# Patient Record
Sex: MALE | Race: WHITE
[De-identification: names, ages, dates, MRNs, and addresses within clinical notes are randomized per-mention and may not be internally consistent; named-entity substitution may affect disease eponyms.]

---

## 2020-10-17 ENCOUNTER — HOSPITAL ENCOUNTER (EMERGENCY)
Dept: HOSPITAL 97 - ER | Age: 65
LOS: 1 days | Discharge: ANOTHER HOSPITAL | End: 2020-10-18
Payer: COMMERCIAL

## 2020-10-17 DIAGNOSIS — Z20.828: ICD-10-CM

## 2020-10-17 DIAGNOSIS — I71.4: Primary | ICD-10-CM

## 2020-10-17 DIAGNOSIS — N13.30: ICD-10-CM

## 2020-10-17 DIAGNOSIS — N13.4: ICD-10-CM

## 2020-10-17 DIAGNOSIS — I10: ICD-10-CM

## 2020-10-17 LAB
ALBUMIN SERPL BCP-MCNC: 3.6 G/DL (ref 3.4–5)
ALP SERPL-CCNC: 98 U/L (ref 45–117)
ALT SERPL W P-5'-P-CCNC: 25 U/L (ref 12–78)
AST SERPL W P-5'-P-CCNC: 15 U/L (ref 15–37)
BUN BLD-MCNC: 16 MG/DL (ref 7–18)
GLUCOSE SERPLBLD-MCNC: 110 MG/DL (ref 74–106)
HCT VFR BLD CALC: 42.1 % (ref 39.6–49)
LIPASE SERPL-CCNC: 102 U/L (ref 73–393)
LYMPHOCYTES # SPEC AUTO: 0.8 K/UL (ref 0.7–4.9)
MORPHOLOGY BLD-IMP: (no result)
PMV BLD: 9 FL (ref 7.6–11.3)
POTASSIUM SERPL-SCNC: 4.1 MMOL/L (ref 3.5–5.1)
RBC # BLD: 4.81 M/UL (ref 4.33–5.43)

## 2020-10-17 PROCEDURE — 74175 CTA ABDOMEN W/CONTRAST: CPT

## 2020-10-17 PROCEDURE — 85025 COMPLETE CBC W/AUTO DIFF WBC: CPT

## 2020-10-17 PROCEDURE — 80048 BASIC METABOLIC PNL TOTAL CA: CPT

## 2020-10-17 PROCEDURE — 99285 EMERGENCY DEPT VISIT HI MDM: CPT

## 2020-10-17 PROCEDURE — 76705 ECHO EXAM OF ABDOMEN: CPT

## 2020-10-17 PROCEDURE — 36415 COLL VENOUS BLD VENIPUNCTURE: CPT

## 2020-10-17 PROCEDURE — 71275 CT ANGIOGRAPHY CHEST: CPT

## 2020-10-17 PROCEDURE — 80076 HEPATIC FUNCTION PANEL: CPT

## 2020-10-17 PROCEDURE — 96375 TX/PRO/DX INJ NEW DRUG ADDON: CPT

## 2020-10-17 PROCEDURE — 96374 THER/PROPH/DIAG INJ IV PUSH: CPT

## 2020-10-17 PROCEDURE — 83690 ASSAY OF LIPASE: CPT

## 2020-10-17 NOTE — XMS REPORT
Continuity of Care Document

                           Created on:2020



Patient:MERY JIMENEZ

Sex:Male

:1955

External Reference #:[id number]





Demographics







                          Address                   [address] [place] [address] RO[address] [facility]



                                                    [address]



                                                    [place] [address]

 

                          Home Phone                [phone]

 

                          Work Phone                [phone]

 

                          Mobile Phone              [phone]

 

                          Email Address             DECLINED

 

                          Preferred Language        English

 

                          Marital Status            Unknown

 

                          Restorationist Affiliation     Unknown

 

                          Race                      Unknown

 

                          Additional Race(s)        Unavailable



                                                    White

 

                          Ethnic Group              Not  or 









Author







                          Organization              [facility]

t

 

                          Address                   [address]



                                                    [place] [address]

 

                          Phone                     [phone]









Support







                Name            Relationship    Address         Phone

 

                Contact         Other           Unavailable     [phone]









Care Team Providers







                    Name                Role                Phone

 

                    Lab,  Fam Pob I     Attending Clinician Unavailable

 

                    Doctor Unassigned,  Name Attending Clinician Unavailable









Problems

This patient has no known problems.



Allergies, Adverse Reactions, Alerts

This patient has no known allergies or adverse reactions.



Medications

This patient has no known medications.



Procedures

This patient has no known procedures.



Encounters







        Start   End     Encounter Admission Attending Care    Care    Encounter 

Source



        Date/Time Date/Time Type    Type    Clinicians Facility Department ID   

   

 

        2020-10-12 2020-10-12 Laboratory         Lab, [facility]    [id number]

[id number] 



        10:31:52 10:51:52 Only            [facility]am [facility]  [id number]         



                                                Marvin [id number]         



                                                Paz [phone]         



                                                nal     044             



                                                Office                  



                                                Building                 



                                                One                     

 

        2020-10-12 2020-10-12 Letter          Doctor  MARIZA    [id number]

31 



        00:00:00 00:00:00 (Out)           UnassignedLOUIS   [id number]       

  



                                        [facility] [id number]         



                                                        [phone]         



                                                        044             







Results

This patient has no known results.

## 2020-10-17 NOTE — EDPHYS
Physician Documentation                                                                           

 [facility]                                                                 

Name: Cj Antoine                                                                               

Age: 65 yrs                                                                                       

Sex: Male                                                                                         

: 1955                                                                                   

MRN: [id number]                                                                                   

Arrival Date: 10/17/2020                                                                          

Time: 19:42                                                                                       

Account#: [id number]                                                                            

Bed 13                                                                                            

Private MD: Drew Minor ED Physician Alex Forte                                                                      

HPI:                                                                                              

10/17                                                                                             

20:03 This 65 yrs old  Male presents to ER via Ambulatory with complaints of         snw 

      Abdominal Cramping.                                                                         

20:03 The patient presents with abdominal pain abdominal distention epigastric area,          snw 

      umbilical area and suprapubic area. Onset: The symptoms/episode began/occurred              

      suddenly, this morning. The symptoms do not radiate. Associated signs and symptoms:         

      Pertinent positives: nausea and vomiting. The symptoms are described as crampy.             

      Severity of pain: At its worst the pain was severe in the emergency department the pain     

      is unchanged. The patient has not experienced similar symptoms in the past. The patient     

      has not recently seen a physician.                                                          

                                                                                                  

Historical:                                                                                       

- Allergies:                                                                                      

19:53 No Known Allergies;                                                                     aj1 

- Home Meds:                                                                                      

19:53 amlodipine oral [Active];                                                               aj1 

- PMHx:                                                                                           

19:53 Hypertension;                                                                           aj1 

                                                                                                  

- Immunization history:: Flu vaccine is not up to date.                                           

- Social history:: Smoking status: Patient/guardian denies using tobacco.                         

                                                                                                  

                                                                                                  

ROS:                                                                                              

20:02 Constitutional: Negative for fever, chills, and weight loss, Eyes: Negative for injury, snw 

      pain, redness, and discharge, ENT: Negative for injury, pain, and discharge, Neck:          

      Negative for injury, pain, and swelling, Cardiovascular: Negative for chest pain,           

      palpitations, and edema, Respiratory: Negative for shortness of breath, cough,              

      wheezing, and pleuritic chest pain, Back: Negative for injury and pain, : Negative        

      for injury, bleeding, discharge, and swelling, MS/Extremity: Negative for injury and        

      deformity, Skin: Negative for injury, rash, and discoloration, Neuro: Negative for          

      headache, weakness, numbness, tingling, and seizure, Psych: Negative for depression,        

      anxiety, suicide ideation, homicidal ideation, and hallucinations.                          

20:02 Abdomen/GI: Positive for abdominal pain, nausea and vomiting, abdominal cramps,             

      abdominal distension.                                                                       

                                                                                                  

Exam:                                                                                             

20:01 Constitutional:  This is a well developed, well nourished patient who is awake, alert,  snw 

      and in no acute distress. Head/Face:  Normocephalic, atraumatic. Eyes:  Pupils equal        

      round and reactive to light, extra-ocular motions intact.  Lids and lashes normal.          

      Conjunctiva and sclera are non-icteric and not injected.  Cornea within normal limits.      

      Periorbital areas with no swelling, redness, or edema. ENT:  Nares patent. No nasal         

      discharge, no septal abnormalities noted.  Tympanic membranes are normal and external       

      auditory canals are clear.  Oropharynx with no redness, swelling, or masses, exudates,      

      or evidence of obstruction, uvula midline.  Mucous membranes moist. Neck:  Trachea          

      midline, no thyromegaly or masses palpated, and no cervical lymphadenopathy.  Supple,       

      full range of motion without nuchal rigidity, or vertebral point tenderness.  No            

      Meningismus. Chest/axilla:  Normal chest wall appearance and motion.  Nontender with no     

      deformity.  No lesions are appreciated. Cardiovascular:  Regular rate and rhythm with a     

      normal S1 and S2.  No gallops, murmurs, or rubs.  Normal PMI, no JVD.  No pulse             

      deficits. Respiratory:  Lungs have equal breath sounds bilaterally, clear to                

      auscultation and percussion.  No rales, rhonchi or wheezes noted.  No increased work of     

      breathing, no retractions or nasal flaring. Abdomen/GI:  Soft, tender, with normal          

      bowel sounds.  + distension no tympany.   Back:  No spinal tenderness.  No                  

      costovertebral tenderness.  Full range of motion. Skin:  Warm, dry with normal turgor.      

      Normal color with no rashes, no lesions, and no evidence of cellulitis. MS/ Extremity:      

      Pulses equal, no cyanosis.  Neurovascular intact.  Full, normal range of motion. Neuro:     

       Awake and alert, GCS 15, oriented to person, place, time, and situation.  Cranial          

      nerves II-XII grossly intact.  Motor strength 5/5 in all extremities.  Sensory grossly      

      intact.  Cerebellar exam normal.  Normal gait. Psych:  Awake, alert, with orientation       

      to person, place and time.  Behavior, mood, and affect are within normal limits.            

                                                                                                  

Vital Signs:                                                                                      

19:50  / 88; Pulse 65; Resp 18; Temp 97.7(O); Pulse Ox 98% on R/A; Weight 79.38 kg (R); aj1 

      Height 5 ft. 10 in. (177.80 cm) (R); Pain 10/10;                                            

20:19  / 82 LA Supine (auto/reg);                                                       dh4 

20:19  / 84 RA Supine (auto/reg);                                                       dh4 

21:57  / 84; Pulse 68; Resp 16; Pulse Ox 96% on R/A;                                    ll2 

22:36  / 62; Pulse 65; Resp 16; Pulse Ox 93% on R/A;                                    ll2 

23:30  / 69; Pulse 55; Resp 15; Pulse Ox 95% on 2 lpm NC;                               ll2 

19:50 Body Mass Index 25.11 (79.38 kg, 177.80 cm)                                             aj1 

                                                                                                  

MDM:                                                                                              

19:56 Patient medically screened.                                                             snw 

22:56 Data reviewed: vital signs, nurses notes. Data interpreted: Pulse oximetry: on room air snw 

      is 98 %. Interpretation: normal. Counseling: I had a detailed discussion with the           

      patient and/or guardian regarding: the historical points, exam findings, and any            

      diagnostic results supporting the discharge/admit diagnosis, the presence of at least       

      one elevated blood pressure reading (>120/80) during this emergency department visit,       

      lab results, radiology results, the need to transfer to another facility, for higher        

      level of care, [facility] does not immediately have the required          

      specialist. Response to treatment: the patient's symptoms have markedly improved after      

      treatment. Physician consultation: Dr Cottrell was called at 22:58, was contacted at          

      22:58, regarding regarding transfer, to [facility].                                      

                                                                                                  

10/17                                                                                             

19:56 Order name: Basic Metabolic Panel; Complete Time: 20:51                                 snw 

10/17                                                                                             

19:56 Order name: CBC with Diff; Complete Time: 21:08                                         snw 

10/17                                                                                             

19:56 Order name: Hepatic Function; Complete Time: 20:51                                      snw 

10/17                                                                                             

19:56 Order name: Lipase; Complete Time: 20:51                                                snw 

10/17                                                                                             

20:41 Order name: Manual Differential; Complete Time: 21:08                                   EDMS

10/17                                                                                             

19:56 Order name: US Abdomen Limited; Complete Time: 21:08                                    snw 

10/17                                                                                             

20:05 Order name: CT Aorta for Dissection                                                     snw 

10/17                                                                                             

23:43 Order name: SARS-COV-2 RT PCR                                                           EDMS

10/17                                                                                             

19:56 Order name: IV Saline Lock; Complete Time: 20:11                                        snw 

10/17                                                                                             

19:56 Order name: Labs collected and sent; Complete Time: 20:12                               snw 

10/17                                                                                             

20:02 Order name: Bilateral blood pressure; Complete Time: 20:20                              snw 

10/17                                                                                             

22:07 Order name: Urine Dipstick-Ancillary (obtain specimen)                                  snw 

                                                                                                  

Administered Medications:                                                                         

20:57 Drug: Phenergan 6.25 mg Route: IVP; Site: right antecubital;                            ll2 

21:00 Follow up: Response: No adverse reaction                                                ll2 

20:57 Drug: fentaNYL (PF) 25 mcg Route: IVP; Site: right antecubital;                         ll2 

21:01 Follow up: Response: No adverse reaction; RASS: Alert and Calm (0)                      ll2 

21:26 Drug: NS 0.9% 1000 ml Route: IV; Rate: 125 ml/hr; Site: right antecubital;              ll2 

                                                                                                  

                                                                                                  

Disposition:                                                                                      

10/17/20 23:00 Transfer ordered to [facility]. Diagnosis are           

  Infrarenal aortic aneurysm with ulcerated plaque, Hydronephrosis/Hydroureter                    

  (Left).                                                                                         

- Reason for transfer: Higher level of care.                                                      

- Accepting physician is [facility] Team.                                                     

- Condition is Stable.                                                                            

- Problem is new.                                                                                 

- Symptoms have improved.                                                                         

                                                                                                  

                                                                                                  

                                                                                                  

Addendum:                                                                                         

10/19/2020                                                                                        

     08:00 Co-signature as Attending Physician, Alex Forte MD I agree with the assessment and  c
ha

           plan of care.                                                                          

                                                                                                  

Signatures:                                                                                       

Dispatcher [employer]                           EDCesilia Antunez, WILFREDO                     RN   aj1                                                  

Alex Forte MD MD cha Waters, Shelly, FNP-C                   FNP-Csnw                                                  

Jannette Reid RN                    RN   ll2                                                  

                                                                                                  

Corrections: (The following items were deleted from the chart)                                    

10/17                                                                                             

20:23 20:04 Chest For PE Angio+CT.RAD.BRZ ordered. EDMS                                       EDMS

22:39 22:00 CORONAVIRUS+MR.LAB.BRZ ordered. EDMS                                              EDMS

10/18                                                                                             

00:53 10/17 23:00 10/17/2020 23:00 Transfer ordered to [facility]. ll2 

      Diagnosis is Infrarenal aortic aneurysm with ulcerated plaque;                              

      Hydronephrosis/Hydroureter (Left). Reason for transfer: Higher level of care. Accepting     

      physician is [facility] Team. Condition is Stable. Problem is new. Symptoms have        

      improved. snw                                                                               

                                                                                                  

**************************************************************************************************

## 2020-10-17 NOTE — XMS REPORT
Summary of Care

                           Created on:2020



Patient:Cj Landaverde

Sex:Male

:1955

External Reference #:[id number]





Demographics







                          Address                   [address] [place] [address]

 

                          Mobile Phone              [phone]

 

                          Home Phone                [phone]

 

                          Email Address             [email or web address]

 

                          Preferred Language        English

 

                          Marital Status            

 

                          Jainism Affiliation     Unknown

 

                          Race                      White

 

                          Ethnic Group              Not  or 









Author







                          Organization              [facility]

 

                          Address                   [address] [place] [address]









Support







                Name            Relationship    Address         Phone

 

                No Contact      Unavailable     Unavailable     [phone]









Care Team Providers







                    Name                Role                Phone

 

                    Drew Minor     Primary Care Provider [phone]









Encounter Details







             Date         Type         Department   Care Team    Description

 

                    10/12/2020          Letter (Out)        [facility]

s



                          Doctor Unassigned, No     



                                                            [address] [facility]



                                        Name



                                        



                                                            [place] [address]-

[phone]



                                        [address] [facility]



                                        



                                       [phone] [place] [address] 







Allergies

Not on Filedocumented as of this encounter (statuses as of 10/12/2020)



Medications

Not on filedocumented as of this encounter (statuses as of 10/12/2020)



Active Problems

Not on filedocumented as of this encounter (statuses as of 10/12/2020)



Social History







             Tobacco Use  Types        Packs/Day    Years Used   Date

 

             Never Assessed                                        









                          Sex Assigned at Birth     Date Recorded

 

                          Not on file               



documented as of this encounter



Last Filed Vital Signs

Not on filedocumented in this encounter



Plan of Treatment







                Health Maintenance Due Date        Last Done       Comments

 

                HEPATITIS C (HCV) SCREEN 1955                      

 

                Depression Screening 1965                      

 

                DTaP,Tdap,and Td Vaccines (1 - Tdap) 1972                 

     

 

                COLON CANCER SCREENING ANNUAL FIT/FOBT 2003               

       

 

                COLON CANCER SCREENING FIT DNA EVERY 3 YEARS 2003         

             

 

                COLON CANCER SCREENING SIGMOIDOSCOPY EVERY 5 YEARS 2003   

                   

 

                COLONOSCOPY     2003                      

 

                Colorectal Cancer Screening 2003                      

 

                Zoster Recombinant Vaccine (SHINGRIX) (1 of 2) 2003       

               

 

                Medicare Wellness Visit 2018                      

 

                PNEUMOCOCCAL VACCINES 65+ (1 of 1 - PPSV23) 2018          

            

 

                INFLUENZA VACCINE (#1) 2020                      



documented as of this encounter



Results

Not on filedocumented in this encounter



Insurance







          Payer     Benefit Plan / Subscriber ID Effective Dates Phone     Addre

ss   Type



                    Group                                             

 

          MEDICARE  MEDICARE PART [id number] 10/12/2020-Pres [phone] [address] 

Medicare



                      A & [address]                 ent        2          [id number]



                                        



                                                            [place] 



                                                            [id number] 



documented as of this encounter

## 2020-10-17 NOTE — RAD REPORT
EXAM DESCRIPTION:  US - Abdomen Exam Limited - 10/17/2020 8:46 pm

 

CLINICAL HISTORY:  ABD PAIN

 

COMPARISON:  No comparisons

 

FINDINGS:  No gallstones, sludge or other abnormalities within the gallbladder lumen. There is no wal
l thickening or pericholecystic fluid. Patient does have several 5 mm or less sized nonshadowing echo
genic foci adherent to the gallbladder wall. These have the appearance of small polyps. These are not
 considered significant at 5 mm or less in size.

 

No common duct stone or biliary tree dilatation identified.

 

IMPRESSION:  Incidental small gallbladder polyps. No gallstones or acute gallbladder finding.

 

No biliary tree abnormality.

## 2020-10-17 NOTE — ER
Nurse's Notes                                                                                     

 [facility]                                                                 

Name: Cj Antoine                                                                               

Age: 65 yrs                                                                                       

Sex: Male                                                                                         

: 1955                                                                                   

MRN: [id number]                                                                                   

Arrival Date: 10/17/2020                                                                          

Time: 19:42                                                                                       

Account#: [id number]                                                                            

Bed 13                                                                                            

Private MD: Drew Minor                                                                       

Diagnosis: Infrarenal aortic aneurysm with ulcerated plaque;Hydronephrosis/Hydroureter (Left)     

                                                                                                  

Presentation:                                                                                     

10/17                                                                                             

19:50 Chief complaint: Patient states: "I had stomach cramps this morning, I burped and felt  aj1 

      better. Then we went out to eat and I was fine, on the way back to the house my stomach     

      I had a the same feeling, but this time I burped, I threw up, I passed gas but nothing      

      helps its just getting worse" Reports abdominal pain to the epigastric and umbilical        

      areas. Reports nausea and vomiting, denies fever. Denies diarrhea. Coronavirus screen:      

      Client denies travel out of the [place] in the last 14 days. At this time, the client does     

      not indicate any symptoms associated with coronavirus-19. Ebola Screen: Patient denies      

      travel to an Ebola-affected area in the 21 days before illness onset. Initial Sepsis        

      Screen: Does the patient meet any 2 criteria? No. Patient's initial sepsis screen is        

      negative. Does the patient have a suspected source of infection? Yes: Acute abdominal       

      pain. Risk Assessment: Do you want to hurt yourself or someone else? Patient reports no     

      desire to harm self or others. Onset of symptoms was 2020.                      

19:50 Method Of Arrival: Ambulatory                                                           aj1 

19:50 Acuity: IRIS 3                                                                           aj1 

                                                                                                  

Triage Assessment:                                                                                

19:53 General: Appears in no apparent distress. uncomfortable, Behavior is calm, cooperative, aj1 

      appropriate for age. Pain: Complains of pain in epigastric area and umbilical area Pain     

      currently is 10 out of 10 on a pain scale. Neuro: Level of Consciousness is awake,          

      alert, obeys commands. Cardiovascular: Patient's skin is warm and dry. Respiratory:         

      Airway is patent Respiratory effort is even, unlabored, Respiratory pattern is regular,     

      symmetrical. GI: Reports lower abdominal pain, upper abdominal pain, nausea, vomiting.      

                                                                                                  

Historical:                                                                                       

- Allergies:                                                                                      

19:53 No Known Allergies;                                                                     aj1 

- Home Meds:                                                                                      

19:53 amlodipine oral [Active];                                                               aj1 

- PMHx:                                                                                           

19:53 Hypertension;                                                                           aj1 

                                                                                                  

- Immunization history:: Flu vaccine is not up to date.                                           

- Social history:: Smoking status: Patient/guardian denies using tobacco.                         

                                                                                                  

                                                                                                  

Screenin:10 Abuse screen: Denies threats or abuse. Nutritional screening: No deficits noted.        ll2 

      Tuberculosis screening: No symptoms or risk factors identified. Fall Risk IV access (20     

      points). Ambulatory Aid- None/Bed Rest/Nurse Assist (0 pts). Gait- Normal/Bed               

      Rest/Wheelchair (0 pts) Mental Status- Oriented to own ability (0 pts). Total Mckeon         

      Fall Scale indicates No Risk (0-24 pts).                                                    

                                                                                                  

Assessment:                                                                                       

20:08 General: Appears in no apparent distress. Behavior is calm, cooperative, appropriate    ll2 

      for age. Pain: Complains of pain in abdomen Pain currently is 10 out of 10 on a pain        

      scale. Neuro: Level of Consciousness is awake, alert, obeys commands, Oriented to           

      person, place, time, situation. Cardiovascular: Capillary refill < 3 seconds Patient's      

      skin is warm and dry. Respiratory: Airway is patent Respiratory effort is even,             

      unlabored, Respiratory pattern is regular, symmetrical. GI: Bowel sounds present X 4        

      quads. Abd is soft Abdomen is tender to palpation. : No signs and/or symptoms were        

      reported regarding the genitourinary system. EENT: No signs and/or symptoms were            

      reported regarding the EENT system. Derm: Skin is intact, is healthy with good turgor,      

      Skin is dry, Skin is pink, warm \T\ dry. Skin temperature is warm. Musculoskeletal:         

      Circulation, motion, and sensation intact. Range of motion: intact in all extremities.      

21:00 Reassessment: Patient and/or family updated on plan of care and expected duration. Pain ll2 

      level reassessed. Patient is alert, oriented x 3, equal unlabored respirations, skin        

      warm/dry/pink.                                                                              

23:25 Reassessment: Patient and/or family updated on plan of care and expected duration. Pain ll2 

      level reassessed. Patient is alert, oriented x 3, equal unlabored respirations, skin        

      warm/dry/pink. attempted to call report to receiving nurse, was asked to call back in       

      ten minutes.                                                                                

10/18                                                                                             

00:20 Reassessment: report given to Talia, RN, family and pt updated and signature obtained.   ll2 

00:54 Reassessment: report given to  EMS for transfer.                                      ll2 

                                                                                                  

Vital Signs:                                                                                      

10/17                                                                                             

19:50  / 88; Pulse 65; Resp 18; Temp 97.7(O); Pulse Ox 98% on R/A; Weight 79.38 kg (R); aj1 

      Height 5 ft. 10 in. (177.80 cm) (R); Pain 10/10;                                            

20:19  / 82 LA Supine (auto/reg);                                                       dh4 

20:19  / 84 RA Supine (auto/reg);                                                       dh4 

21:57  / 84; Pulse 68; Resp 16; Pulse Ox 96% on R/A;                                    ll2 

22:36  / 62; Pulse 65; Resp 16; Pulse Ox 93% on R/A;                                    ll2 

23:30  / 69; Pulse 55; Resp 15; Pulse Ox 95% on 2 lpm NC;                               ll2 

19:50 Body Mass Index 25.11 (79.38 kg, 177.80 cm)                                             aj1 

                                                                                                  

ED Course:                                                                                        

19:42 Patient arrived in ED.                                                                  am2 

19:42 Drew Minor MD is Private Physician.                                               am2 

19:46 [facility]                                                          snw 

19:46 Alex Forte MD is Attending Physician.                                             snw 

19:52 Triage completed.                                                                       aj1 

19:53 Arm band placed on Patient placed in an exam room.                                      aj1 

20:08 Jannette Reid, WILFREDO is Primary Nurse.                                                  ll2 

20:11 Patient has correct armband on for positive identification. Placed in gown. Bed in low  ll2 

      position. Call light in reach. Side rails up X 1. Pulse ox on. NIBP on.                     

20:11 Inserted saline lock: 20 gauge in right antecubital area, using aseptic technique.      dh4 

      Blood collected.                                                                            

20:46 US Abdomen Limited In Process Unspecified.                                              EDMS

20:46 Ultrasound completed. Patient tolerated well. Notified NP/TNO rai.                    sg3 

21:20 CT Aorta for Dissection In Process Unspecified.                                         EDMS

10/18                                                                                             

00:26 No provider procedures requiring assistance completed. Patient transferred, IV remains  ll2 

      in place.                                                                                   

                                                                                                  

Administered Medications:                                                                         

10/17                                                                                             

20:57 Drug: Phenergan 6.25 mg Route: IVP; Site: right antecubital;                            ll2 

21:00 Follow up: Response: No adverse reaction                                                ll2 

20:57 Drug: fentaNYL (PF) 25 mcg Route: IVP; Site: right antecubital;                         ll2 

21:01 Follow up: Response: No adverse reaction; RASS: Alert and Calm (0)                      ll2 

21:26 Drug: NS 0.9% 1000 ml Route: IV; Rate: 125 ml/hr; Site: right antecubital;              ll2 

                                                                                                  

                                                                                                  

Outcome:                                                                                          

23:00 ER care complete, transfer ordered by MD. petty 

10/18                                                                                             

00:26 Transferred by ground EMS to [facility].                             ll2 

      Condition: stable                                                                           

      Instructed on the need for transfer.                                                        

00:53 Patient left the ED.                                                                    ll2 

                                                                                                  

Signatures:                                                                                       

Dispatcher [employer]                           EDCesilia Antunez RN                     RN   karri1                                                  

Gunjan Rai, FNP-C                   FNP-Csnw                                                  

Monica Carlos am2                                                  

Nina Patel                               3                                                  

Aldo Cuadra                                 4                                                  

Jannette Reid RN                    RN   ll2                                                  

                                                                                                  

**************************************************************************************************

## 2020-10-17 NOTE — XMS REPORT
Summary of Care

                           Created on:2020



Patient:Cj Landaverde

Sex:Male

:1955

External Reference #:[id number]





Demographics







                          Address                   [address] [place] [address]

 

                          Mobile Phone              [phone]

 

                          Home Phone                [phone]

 

                          Email Address             [email or web address]

 

                          Preferred Language        English

 

                          Marital Status            

 

                          Confucianism Affiliation     Unknown

 

                          Race                      White

 

                          Ethnic Group              Not  or 









Author







                          Organization              [facility]

 

                          Address                   [address] [place] [id number]









Support







                Name            Relationship    Address         Phone

 

                No Contact      Unavailable     Unavailable     [phone]









Care Team Providers







                    Name                Role                Phone

 

                    Drew Minor     Primary Care Provider [phone]









Reason for Visit







                          Reason                    Comments

 

                          LAB                       covid







Encounter Details







             Date         Type         Department   Care Team    Description

 

                10/12/2020      Laboratory Only [facility] Kathie Reich, ZHANE



[address] [address] [phone] (Fax)                      Exposure to COVID-19



                                                            [facility]                      virus (Primary Dx)



                                       [address] [place], [place]              



                                                            [id number] [phone]              







Allergies

Not on Filedocumented as of this encounter (statuses as of 10/12/2020)



Medications

Not on filedocumented as of this encounter (statuses as of 10/12/2020)



Active Problems

Not on filedocumented as of this encounter (statuses as of 10/12/2020)



Social History







             Tobacco Use  Types        Packs/Day    Years Used   Date

 

             Never Assessed                                        









                          Sex Assigned at Birth     Date Recorded

 

                          Not on file               









                    COVID-19 Exposure   Response            Date Recorded

 

                    In the last month, have you been in contact with No / Unsure

         10/12/2020 10:35 AM

CDT



                    someone who was confirmed or suspected to have              

       



                    Coronavirus / COVID-19?                     



documented as of this encounter



Last Filed Vital Signs

Not on filedocumented in this encounter



Nursing Notes

Lluvia Riddle RN - 10/12/2020 10:20 AM CDTCj Landaverde is a 65 year old male 
here for COVID Screening with a Nasopharyngeal Swab



All droplet and contact precautions taken with appropriate PPE worn while 
interacting with patient.

? Goggles

? N95 Mask

? Gloves

? Gown



RR 18 Pulse Ox 97%



Patient educated on plan of care for visit, swabbing technique, risks and 
benefits of test and length of time to receive results. Verbal consent obtained 
to perform test. CDC Fact Sheet for Patients nCoV Diagnostic Panel dated 
3/15/2020 and Factsheet What to Do if Sick with COVID 19 20 provided.



Patient swabbed per appropriate nasopharyngeal technique, and patient tolerated 
well. Patient was discharged from the testing clinic in stable condition.



Lluvia Riddle RN  10/12/2020  10:36 AM

Electronically signed by Lluvia Riddle RN at 10/12/2020 10:36 AM CDTdocumented
in this encounter



Plan of Treatment







             Name         Type         Priority     Associated Diagnoses Order S

chedule

 

             COVID-19 (PCR MOLECULAR LAB          Routine      Exposure to COVID

-19 Expected: 10/12/2020,



             TESTING)                               virus        Expires: 10/12/

2021









                Health Maintenance Due Date        Last Done       Comments

 

                HEPATITIS C (HCV) SCREEN 1955                      

 

                Depression Screening 1965                      

 

                DTaP,Tdap,and Td Vaccines (1 - Tdap) 1972                 

     

 

                COLON CANCER SCREENING ANNUAL FIT/FOBT 2003               

       

 

                COLON CANCER SCREENING FIT DNA EVERY 3 YEARS 2003         

             

 

                COLON CANCER SCREENING SIGMOIDOSCOPY EVERY 5 YEARS 2003   

                   

 

                COLONOSCOPY     2003                      

 

                Colorectal Cancer Screening 2003                      

 

                Zoster Recombinant Vaccine (SHINGRIX) (1 of 2) 2003       

               

 

                Medicare Wellness Visit 2018                      

 

                PNEUMOCOCCAL VACCINES 65+ (1 of 1 - PPSV23) 2018          

            

 

                INFLUENZA VACCINE (#1) 2020                      



documented as of this encounter



Results

Not on filedocumented in this encounter



Visit Diagnoses







                                        Diagnosis

 

                                        Exposure to COVID-19 virus - Primary



documented in this encounter



Additional Health Concerns







                Infection       Onset Date      Last Indicated  Resolved Time

 

                COVID-19 Rule Out 10/12/2020      10/12/2020      



documented as of this encounter



Insurance







          Payer     Benefit Plan / Subscriber ID Effective Dates Phone     Addre

ss   Type



                    Group                                             

 

          MEDICARE  MEDICARE PART [id number] 10/12/2020-Pres [phone] [address] 

Medicare



                      A & [address]                 ent        [address]          [id number]



                                        



                                                            [place] 



                                                            [address] 









           Guarantor Name Account Type Relation to Date of Birth Phone      Bill

ing



                                 Patient                          Address

 

           Cj Landaverde Personal/Family Self       1955 [phone]







                                                       (Home)     [place], [place]



                                                                  [id number]



documented as of this encounter

## 2020-10-18 VITALS — OXYGEN SATURATION: 95 % | DIASTOLIC BLOOD PRESSURE: 69 MMHG | SYSTOLIC BLOOD PRESSURE: 116 MMHG

## 2020-10-18 VITALS — TEMPERATURE: 97.7 F

## 2020-10-18 NOTE — RAD REPORT
EXAM DESCRIPTION:  Angio  Aorta For Dissection

 

******** ADDENDUM #1 ********

ADDENDUM:

THIS REPORT CONTAINS FINDINGS THAT MAY BE CRITICAL TO PATIENT'S CARE:

The findings were verbally discussed via telephone conference with TYRON PALMER NP by Dr. Rafa gu 10/17/2020 9:48 PM CDT. The results were acknowledged and understood.

Electronically signed by:   Julio C Craig DO   10/17/2020 9:48 PM CDT Workstation: [id number]

*** End of Addendum ***

PROCEDURE:

CT Angiography Chest, Abdomen and Pelvis With Intravenous Contrast

 

CLINICAL HISTORY:  The patient is 65 years old and is Male; ABDOMINAL DISTENTION

 

TECHNIQUE:  Axial computed tomographic angiography images of the chest, abdomen and pelvis with intra
venous contrast.   This CT exam was performed using one or more of the following dose reduction techn
iques:   automated exposure control, adjustment of the mA and/or kV according to patient size, and/or
 use of iterative reconstruction technique.

MIP reconstructed images were created and reviewed.

DLP:  947 mGy*cm

 

COMPARISON:  None.

 

FINDINGS:  VASCULATURE:

AORTA:    Partially thrombosed infrarenal abdominal aortic aneurysm measuring 2.8 cm with ulcerated p
laque.

PULMONARY ARTERIES:    Unremarkable as visualized.   No pulmonary embolism is identified.

GREAT VESSELS OF AORTIC ARCH:    No acute findings.   No dissection.   No arterial occlusion or signi
ficant stenosis.

CELIAC TRUNK AND MESENTERIC ARTERIES:  Mild stenosis of the proximal SMA. Celiac axis is within isauro
l limits.

RENAL ARTERIES/VEINS:    No acute findings.   No occlusion or significant stenosis. Retroaortic left 
renal vein.

ILIAC ARTERIES:    No acute findings.   No occlusion or significant stenosis.

CHEST:

LUNGS:    Chronic lung changes with scattered pneumatoceles bibasilar scarring and mild interlobular 
septal thickening. Mild centrilobular emphysematous changes. Punctate right upper lobe nodule.

PLEURAL SPACE:    Unremarkable.   No significant effusion.   No pneumothorax.

HEART:    Unremarkable.   No cardiomegaly.   No significant pericardial effusion.

THYROID:    Visualized thyroid is normal.

ABDOMEN:

LIVER:    Hepatic steatosis.

GALLBLADDER AND BILE DUCTS:    Unremarkable.   No calcified stones.   No ductal dilation.

PANCREAS:    Unremarkable.   No ductal dilation.   No mass.

SPLEEN:    Unremarkable.   No splenomegaly.

ADRENALS:    Unremarkable.   No mass.

KIDNEYS AND URETERS:    Advanced asymmetric left renal atrophy with multiple parapelvic cysts and sev
ere hydronephrosis/hydroureter. No perinephric stranding.    No solid mass.

STOMACH AND BOWEL:    Unremarkable.   No obstruction.   No mucosal thickening.

PELVIS:

APPENDIX:    The appendix is seen and is within normal limits.

BLADDER:    Posteriorly oriented right bladder diverticulum measuring 2.4 cm. Circumferential bladder
 wall thickening.

REPRODUCTIVE:    Unremarkable as visualized.

CHEST, ABDOMEN and PELVIS:

INTRAPERITONEAL SPACE:    Unremarkable.   No significant fluid collection.   No free air.

BONES/JOINTS:    Lower lumbar degenerative changes. No acute fracture.   No dislocation.

SOFT TISSUES:    Fat-containing left inguinal hernia.

LYMPH NODES:    Unremarkable.   No enlarged lymph nodes.

 

IMPRESSION:  1.   No acute intrathoracic abnormality.

2.   Partially thrombosed infrarenal abdominal aortic aneurysm measuring 2.8 cm with ulcerated plaque
. Penetrating ulcer or contained pseudoaneurysm could have similar imaging characteristics.   Vascula
r surgical consultation is recommended.

3.   Advanced asymmetric left renal atrophy with multiple parapelvic cysts and severe hydronephrosis/
hydroureter. No perinephric stranding.

4.   Posteriorly oriented right bladder diverticulum measuring 2.4 cm. Circumferential bladder wall t
hickening. Correlate with urinalysis.

5.   Hepatic steatosis.

6.   Chronic lung changes. 2 mm right upper lobe nodule. A non-contrast Chest CT at 12 months is opti
onal. If performed and the nodule is stable at 12 months, no further follow-up is recommended.

 

Electronically signed by:   Julio C Craig DO   10/17/2020 9:45 PM CDT Workstation: [id number]

 

 

Due to temporary technical issues with the PACS/Fluency reporting system, reports are being signed by
 the in house radiologist without review as a courtesy to ensure prompt reporting. The interpreting r
adiologist is fully responsible for the content of the report.

## 2021-08-12 LAB
ALBUMIN SERPL BCP-MCNC: 3.9 G/DL (ref 3.4–5)
ALP SERPL-CCNC: 92 U/L (ref 45–117)
ALT SERPL W P-5'-P-CCNC: 20 U/L (ref 12–78)
AMYLASE SERPL-CCNC: 34 U/L (ref 25–115)
AST SERPL W P-5'-P-CCNC: 9 U/L (ref 15–37)
BUN BLD-MCNC: 17 MG/DL (ref 7–18)
GLUCOSE SERPLBLD-MCNC: 70 MG/DL (ref 74–106)
HCT VFR BLD CALC: 45.2 % (ref 39.6–49)
LIPASE SERPL-CCNC: 106 U/L (ref 73–393)
LYMPHOCYTES # SPEC AUTO: 0.8 K/UL (ref 0.7–4.9)
PMV BLD: 8.2 FL (ref 7.6–11.3)
POTASSIUM SERPL-SCNC: 5 MMOL/L (ref 3.5–5.1)
RBC # BLD: 5.06 M/UL (ref 4.33–5.43)

## 2021-08-12 NOTE — RAD REPORT
EXAM DESCRIPTION:  RAD - Chest Pa And Lat (2 Views) - 8/12/2021 1:19 pm

 

CLINICAL HISTORY:  preassessment for surgery, 40+ pack-year smoking history, pending cholecystectomy

 

COMPARISON:  None

 

TECHNIQUE:  Frontal and lateral views of the chest were obtained.

 

FINDINGS:  The lungs are clear of failure infiltrate or mass. No acute lung parenchymal process ident
ifiable.   Heart size is normal and central vasculature is within normal limits.  No pleural effusion
 or pneumothorax seen.  No acute bony finding noted.  No aortic abnormality.

 

IMPRESSION:  No acute cardiopulmonary process.

## 2021-08-18 ENCOUNTER — HOSPITAL ENCOUNTER (OUTPATIENT)
Dept: HOSPITAL 97 - OR | Age: 66
Discharge: HOME | End: 2021-08-18
Attending: SURGERY
Payer: COMMERCIAL

## 2021-08-18 VITALS — DIASTOLIC BLOOD PRESSURE: 83 MMHG | SYSTOLIC BLOOD PRESSURE: 133 MMHG | TEMPERATURE: 98 F | OXYGEN SATURATION: 99 %

## 2021-08-18 DIAGNOSIS — Z20.822: ICD-10-CM

## 2021-08-18 DIAGNOSIS — K82.8: ICD-10-CM

## 2021-08-18 DIAGNOSIS — R10.11: ICD-10-CM

## 2021-08-18 DIAGNOSIS — K80.10: Primary | ICD-10-CM

## 2021-08-18 PROCEDURE — 47562 LAPAROSCOPIC CHOLECYSTECTOMY: CPT

## 2021-08-18 PROCEDURE — 85025 COMPLETE CBC W/AUTO DIFF WBC: CPT

## 2021-08-18 PROCEDURE — 93005 ELECTROCARDIOGRAM TRACING: CPT

## 2021-08-18 PROCEDURE — 80076 HEPATIC FUNCTION PANEL: CPT

## 2021-08-18 PROCEDURE — 82150 ASSAY OF AMYLASE: CPT

## 2021-08-18 PROCEDURE — 80048 BASIC METABOLIC PNL TOTAL CA: CPT

## 2021-08-18 PROCEDURE — 83690 ASSAY OF LIPASE: CPT

## 2021-08-18 PROCEDURE — 0FT44ZZ RESECTION OF GALLBLADDER, PERCUTANEOUS ENDOSCOPIC APPROACH: ICD-10-PCS

## 2021-08-18 PROCEDURE — 71046 X-RAY EXAM CHEST 2 VIEWS: CPT

## 2021-08-18 PROCEDURE — 88304 TISSUE EXAM BY PATHOLOGIST: CPT

## 2021-08-18 PROCEDURE — 36415 COLL VENOUS BLD VENIPUNCTURE: CPT

## 2021-08-25 NOTE — DS
Date of Discharge:  08/18/2021



Diagnoses:  Symptomatic cholelithiasis, biliary dyskinesia, right upper quadrant abdominal pain.



Procedure:  Laparoscopic cholecystectomy.



Disposition:  Home.



Condition:  Stable.



Activity:  As tolerated.  No heavy lifting.



Plan:  Follow up in my office in 1 week.  Call for appointment at [phone].  Keep area dry for 48 maria elena
rs, then may shower.



Medications:  See orders.





IBETH/SOPHY

DD:  08/25/2021 12:48:43Voice ID:  [id number]

DT:  08/25/2021 16:24:34Report ID:  [id number]

## 2021-08-25 NOTE — OP
Date of Procedure:  08/18/2021



Surgeon:  Levy Koenig MD



Preoperative Diagnoses:  Symptomatic cholelithiasis, biliary dyskinesia, right upper quadrant abdomin
al pain.



Postoperative Diagnoses:  Symptomatic cholelithiasis, biliary dyskinesia, right upper quadrant abdomi
nal pain.



Procedure:  Laparoscopic cholecystectomy.



Indication:  This is the case of a 66-year-old patient with above diagnosis.  Fully explained the antionette
efits, alternatives, and risks of laparoscopic possible open cholecystectomy, which include, but not 
limited to infection, bleeding, damage to adjacent structures, anesthesia complication, choledocholit
hiasis, bile leak, pancreatitis, MI, and even death.  He also understands this may not relieve any sy
mptoms.  He might need more than one surgical intervention.  He understood, signed a consent.



Procedure In Detail:  The patient was brought to the operating room and placed in supine position.  A
nesthesia was done without complication.  Abdominal area was prepped and draped in the usual sterile 
fashion.  Marcaine 0.5% was injected for local anesthetic followed by sharp incision of the skin in t
he infraumbilical region.  Incision was carried down to fascia, which was opened under direct vision.
  Peritoneum was encountered, opened under direct vision.  Vicryl #1 placed inside the fascia.  Hasso
n trocar was carefully introduced.  Pneumoperitoneum was obtained.  I placed 3 more trocars, 5 mm eac
h one of them in the right upper quadrant under direct visualization.  This allowed me to put a grasp
er in the fundus of the gallbladder, another grasper in the infundibulum retracting the gallbladder i
n the inferolateral fashion, exposing the triangle of Calot and obtaining critical view.  Cystic duct
 and cystic artery were clearly isolated, freed circumferentially and a connection between those and 
the gallbladder were clearly identified.  I proceeded to ligate those by using at least 3 clips proxi
mal, 1 clip distal, ligation in the middle.  Same was done with the cystic artery.  No bile leak.  No
 bleeding.  The gallbladder was removed from liver using the Bovie cauterizer and removed from abdomi
nal cavity using EndoCatch through umbilical incision.  The area was inspected once again.  No bile l
eak.  No bleeding.  At that moment, I proceeded to remove the trocars under direct vision.  Deflated 
the pneumoperitoneum.  Closed the fascia with #1 Vicryl.  Irrigated subcutaneous tissues, closed that
 with 3-0 chromic and skin approximated.  Sponge count and instrument counts correct.  The patient to
lerated the procedure well.  The patient was sent to recovery in stable condition.





IBETH/SOPHY

DD:  08/25/2021 12:48:43Voice ID:  [id number]

DT:  08/25/2021 16:23:10Report ID:  [id number]

## 2021-09-17 LAB
BUN BLD-MCNC: 14 MG/DL (ref 7–18)
GLUCOSE SERPLBLD-MCNC: 81 MG/DL (ref 74–106)
HCT VFR BLD CALC: 44.8 % (ref 39.6–49)
INR BLD: 0.97
LYMPHOCYTES # SPEC AUTO: 1.1 K/UL (ref 0.7–4.9)
PMV BLD: 9.2 FL (ref 7.6–11.3)
POTASSIUM SERPL-SCNC: 4.3 MMOL/L (ref 3.5–5.1)
RBC # BLD: 4.98 M/UL (ref 4.33–5.43)

## 2021-09-21 ENCOUNTER — HOSPITAL ENCOUNTER (OUTPATIENT)
Dept: HOSPITAL 97 - OR | Age: 66
Discharge: HOME | End: 2021-09-21
Attending: UROLOGY
Payer: COMMERCIAL

## 2021-09-21 VITALS — OXYGEN SATURATION: 97 % | SYSTOLIC BLOOD PRESSURE: 122 MMHG | DIASTOLIC BLOOD PRESSURE: 69 MMHG | TEMPERATURE: 97.2 F

## 2021-09-21 DIAGNOSIS — D09.0: ICD-10-CM

## 2021-09-21 DIAGNOSIS — R31.29: ICD-10-CM

## 2021-09-21 DIAGNOSIS — N13.30: ICD-10-CM

## 2021-09-21 DIAGNOSIS — C67.4: Primary | ICD-10-CM

## 2021-09-21 DIAGNOSIS — Z20.822: ICD-10-CM

## 2021-09-21 PROCEDURE — 80048 BASIC METABOLIC PNL TOTAL CA: CPT

## 2021-09-21 PROCEDURE — 85610 PROTHROMBIN TIME: CPT

## 2021-09-21 PROCEDURE — 85025 COMPLETE CBC W/AUTO DIFF WBC: CPT

## 2021-09-21 PROCEDURE — 36415 COLL VENOUS BLD VENIPUNCTURE: CPT

## 2021-09-21 PROCEDURE — 87086 URINE CULTURE/COLONY COUNT: CPT

## 2021-09-21 PROCEDURE — 88305 TISSUE EXAM BY PATHOLOGIST: CPT

## 2021-09-21 PROCEDURE — 87088 URINE BACTERIA CULTURE: CPT

## 2021-09-21 PROCEDURE — 0TBB8ZX EXCISION OF BLADDER, VIA NATURAL OR ARTIFICIAL OPENING ENDOSCOPIC, DIAGNOSTIC: ICD-10-PCS

## 2021-09-21 PROCEDURE — 52204 CYSTOSCOPY W/BIOPSY(S): CPT

## 2021-09-22 NOTE — OP
Date of Procedure:  09/21/2021



Surgeon:  CANDIDA HECK



Preoperative Diagnoses:  

1.Erythematous bladder lesions.

2.Left renal atrophy.



Postoperative Diagnoses:  

1.Erythematous bladder lesions.

2.Left renal atrophy.

3.Congenital orthotopic left obstructing ureterocele.

4.Meatal stenosis.



Principle Procedure:  

1.Cystoscopy, bladder biopsies and fulguration.

2.Attempted left retrograde pyelography.

3.Meatal dilation using sounds.



Indication For Procedure:  Mr. Antoine presented to the Urology Clinic with bothersome lower urinary 
symptoms.  He was a smoker and eventually underwent cystoscopic evaluation, which revealed erythemato
us patches within the posterolateral surfaces of his bladder, independent of any signs of infection. 
 He also had left renal atrophy, which was presumably due to thickening of his bladder at the level o
f the UVJ according to imaging.  As a result, I recommended operative evaluation to include bladder b
iopsies of the erythematous regions noted as well as attempted left retrograde pyelography to evaluat
e the source of the UVJ obstruction seen.



Procedure In Detail:  The patient was consented in the operative holding area before being transferre
d to the operative suite where general anesthesia was induced.  He was given antimicrobial prophylaxi
s and pneumo boots were provided for DVT prophylaxis.  He was placed in the lithotomy position, padde
d and secured to the table appropriately.  His genitalia were prepped using Hibiclens and he was drap
ed in standard fashion.  The case was begun using urethral sounds to dilate the meatus and fossa julian
cularis to 26-Belizean.  Then, using the 22-Belizean rigid cystoscope, I was able to traverse the urethra
 and into the bladder with ease.  The bladder was surveyed in its entirety, and as previously been no
luisa, he had erythematous regions within the posterolateral surfaces of the bladder bilaterally.  Nimesh
tionally, there was evidence of diverticula formation in the right posterolateral as well as within t
he dome posteriorly on the left.  The right posterolateral diverticulum was large enough for the cyst
oscope to enter via a narrow neck and survey it on the inside, but no mucosal lesions were noted with
in.  The remainder of the bladder was free of foreign body or stone.  I then surveyed the ureteral or
ifices and noted that the right ureteral orifice was relatively pinpoint in configuration, but the le
ft ureteral orifice was not distinctly visible due to what appeared to be a congenital, obstructing, 
orthotopic, non-prolapsing ureterocele.  Evidence of peristalsis of the ureter at the UVJ was observe
d on the left, but no efflux of urine was observed at either point.  Attempts to identify the uretera
l orifice using the tip of a 5-Belizean access catheter did not reveal actual ureteral lumen entry.  Of
 note, there was a pit within the surface of the bladder distal and slightly lateral to the suspected
 ureterocele, but within that pit, no definitive ureteral orifice injury was observed.  As a result, 
no retrograde pyelogram could be performed on the left side. 



I then turned my attention back to the bladder where narrow band imaging was also performed confirmin
g the increased vascularity in the regions of suspicion seen posterolaterally bilaterally.  As a resu
lt, using cold cup biopsy forceps, 3 samples were taken from each region in the right posterolateral 
wall as well as the left posterolateral wall.  These were sent for pathologic analysis.  Careful fulg
uration of each of the spots was performed using Bugbee electrocautery and a cautery setting of 30.  
In the end, his bladder was decompressed and after fulguration, it was completely hemostatic.  As a r
esult, his bladder was irrigated of any blood and blood clots, and with the urine remaining clear wit
hout any evidence of ongoing bleeding, the bladder was decompressed, the cystoscope was removed, the 
patient was taken out of the lithotomy position, he was awakened from general anesthesia, and then tr
ansferred to the recovery room in good condition after being transferred to a stretcher.



Complications:  None.



Discharge Disposition:  With regard to his bladder biopsies, he should follow up in the Urology Clini
c within the next 2 or 3 weeks to discuss the results of the pathology. 



With regard to the suspected left congenital, orthotopic, non-prolapsing, obstructing left ureterocel
e, I would recommend a MAG3 Lasix renogram to be obtained to assess the split renal function.  If the
re is reasonable residual function to his left kidney, consideration may be given to placement of a p
ercutaneous nephroureteral stent versus simple transurethral resection/unroofing of the ureterocele.





WR/MODL

DD:  09/21/2021 15:27:00Voice ID:  [id number]

DT:  09/22/2021 01:58:57Report ID:  [id number]

## 2021-10-26 ENCOUNTER — HOSPITAL ENCOUNTER (OUTPATIENT)
Dept: HOSPITAL 97 - OR | Age: 66
Discharge: HOME | End: 2021-10-26
Attending: UROLOGY
Payer: COMMERCIAL

## 2021-10-26 VITALS — OXYGEN SATURATION: 97 % | DIASTOLIC BLOOD PRESSURE: 71 MMHG | SYSTOLIC BLOOD PRESSURE: 128 MMHG | TEMPERATURE: 97.7 F

## 2021-10-26 DIAGNOSIS — Z20.822: ICD-10-CM

## 2021-10-26 DIAGNOSIS — N13.5: ICD-10-CM

## 2021-10-26 DIAGNOSIS — D09.0: Primary | ICD-10-CM

## 2021-10-26 DIAGNOSIS — Q62.31: ICD-10-CM

## 2021-10-26 PROCEDURE — 87088 URINE BACTERIA CULTURE: CPT

## 2021-10-26 PROCEDURE — 3E0K705 INTRODUCTION OF OTHER ANTINEOPLASTIC INTO GENITOURINARY TRACT, VIA NATURAL OR ARTIFICIAL OPENING: ICD-10-PCS

## 2021-10-26 PROCEDURE — 88305 TISSUE EXAM BY PATHOLOGIST: CPT

## 2021-10-26 PROCEDURE — 51720 TREATMENT OF BLADDER LESION: CPT

## 2021-10-26 PROCEDURE — 52204 CYSTOSCOPY W/BIOPSY(S): CPT

## 2021-10-26 PROCEDURE — 0TBB8ZX EXCISION OF BLADDER, VIA NATURAL OR ARTIFICIAL OPENING ENDOSCOPIC, DIAGNOSTIC: ICD-10-PCS

## 2021-10-26 PROCEDURE — 87086 URINE CULTURE/COLONY COUNT: CPT

## 2021-10-26 RX ADMIN — CEFAZOLIN ONE MLS: 1 INJECTION, POWDER, FOR SOLUTION INTRAMUSCULAR; INTRAVENOUS; PARENTERAL at 09:45

## 2021-10-26 RX ADMIN — CEFAZOLIN ONE MLS: 1 INJECTION, POWDER, FOR SOLUTION INTRAMUSCULAR; INTRAVENOUS; PARENTERAL at 10:14

## 2021-10-27 NOTE — OP
Date of Procedure:  10/26/2021



Surgeon:  CANDIDA HECK



Preoperative Diagnoses:  

1.Pathologic stage T1 high-grade urothelial carcinoma.

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

3.Left hydronephrosis with essentially nonfunctioning kidney.

4.Left distal ureteral filling defect/mass.



Postoperative Diagnoses:  

1.Pathologic stage T1 high-grade urothelial carcinoma.

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

3.Left hydronephrosis with essentially nonfunctioning kidney.

4.Left distal ureteral filling defect/mass.



Principal Procedures:  

1.Cystoscopy and bladder biopsies with fulguration.

2.Transurethral unroofing of suspected ureterocele/obscured ureteral orifice.

3.Instillation of intravesical gemcitabine chemotherapy 2 g in 50 cc normal saline.



Indication For Procedure:  Mr. Antoine presents today in followup of a prior cystoscopy and bladder b
iopsies, which identified the presence of high-grade urothelial carcinoma without any papillary tumor
s being visible, simply significant erythema and thickening of the bladder.  Because he was pathologi
c stage T1 and because there is a 35% risk of occult muscle invasive disease, he was counseled on the
 need for repeat operative evaluation and bladder biopsies, and in the interim, he underwent a CT uro
gram, which identified a left distal ureteral filling defect, suspected mass about 16 mm.  As a resul
t, in addition to needing repeat restaging bladder biopsies, he also would require some effort to ayana
luate the distal left ureter.



Procedure In Detail:  The patient was consented in the preoperative holding area before being transfe
rred to the operative suite where general anesthesia was induced.  He was given Ancef 2 g IV antimicr
obial prophylaxis and pneumo boots were provided for DVT prophylaxis.  He was placed in the lithotomy
 position, padded and secured to the table appropriately.  His genitalia were prepped using Hibiclens
 and draped in standard fashion.  The case was begun using a 22-Russian cystoscope to traverse the ure
thra and into the bladder with ease.  There were no prostatic urethral lesions noted, and within the 
bladder posteriorly and bilaterally, the previously noted erythema persisted.  The sites of prior bio
psies were identified.  The prior observed diverticula were also identified and were again surveyed w
ithout any mucosal lesion within.  As a result, I began using cold cup biopsy forceps to take additio
nal biopsy samples in the region of erythema within the left lateral wall posteriorly as well as the 
right posterior and lateral wall of the bladder.  These were sent for pathologic analysis.  I then us
ed urethral sounds to dilate his meatus and fossa navicularis to 30-Russian and then utilized the rese
ctoscope to take additional specimens from the area of the putative left ureteral orifice/orthotopic 
ureterocele.  Of note, there was a cellule/small diverticulum in the region of the ureteral orifice/u
reterocele on the left side.  Looking at the tissue within this, there was some irregularity.  As a r
esult, a biopsy using the resectoscope was taken of tissue in that region.  An additional sample was 
taken from tissue within the trigone medial to the left putative ureteral orifice or region of the ur
eterocele.  Additionally, resection of an area of bulbous tissue potentially representing the uretero
walter was also performed, and each of the samples was sent for pathologic analysis.  Unfortunately, af
ter all attempts to unroof what was suspected to potentially be a ureterocele, no definitive ureteral
 orifice was identified.  As a result, left ureteroscopy and evaluation of the suspected mass could n
ot be performed.  As a result, I sent all of the resected samples for pathologic analysis and careful
ly fulgurated the base of each.  The bladder biopsies were fulgurated using a Bugbee electrode, and t
he specimens taken using the resectoscope were fulgurated using the resectoscope loop.  I then turned
 my attention to the prostatic urethra where at 5 and 7 o'clock from just within the bladder neck of 
the prostate to the mid gland, a sample/resection of the prostate was taken and sent for pathologic a
nalysis.  This was a prostatic urethral biopsy.  I then fulgurated this carefully and with the bladde
r decompressed, observed for any bleeding.  When no bleeding was noted, I then placed a 20-Russian 2-w
ay Ribeiro catheter into his bladder to decompress it of fluid and urine.  I then back filled his bladd
er with gemcitabine 2 g in 50 cc normal saline.  A clamp was applied and the catheter was attached to
 a leg bag for ease of evacuation later in a closed system.  The patient was then taken out of the li
thotomy position, awakened from general anesthesia, transferred to a stretcher, and then transferred 
to the recovery room in good condition.



Complications:  None.



Discharge Disposition:  He will maintain the intravesical chemotherapy for at least 60 minutes, ideal
ly up to 90 minutes to 2 hours.  We will endeavor to rotate him by 1/4 turn every 15 minutes over the
 course of an hour and once the bladder is decompressed of the chemotherapeutic agent, we will remove
 that catheter and consider replacing it.  With that plan, the patient rejected the idea of having an
other catheter in placed; so he was allowed to be discharged from the hospital without a Ribeiro cathet
er.  Followup should be established in the Urology Clinic in 1-3 weeks to discuss the Pathology and a
djuvant therapy likely with BCG as long as no muscle invasive disease is found.  Additionally, the pa
tient will require a left percutaneous nephrostomy tube placed in order to advance a left nephrourete
ral stent in order to identify the distal left ureteral orifice for evaluation and management subsequ
franci.





RODNEY/SOPHY

DD:  10/26/2021 15:18:34Voice ID:  [id number]

DT:  10/27/2021 02:02:21Report ID:  [id number]

## 2021-12-09 LAB
BUN BLD-MCNC: 15 MG/DL (ref 7–18)
GLUCOSE SERPLBLD-MCNC: 91 MG/DL (ref 74–106)
HCT VFR BLD CALC: 42.4 % (ref 39.6–49)
LYMPHOCYTES # SPEC AUTO: 1.2 K/UL (ref 0.7–4.9)
PMV BLD: 8.3 FL (ref 7.6–11.3)
POTASSIUM SERPL-SCNC: 4.3 MMOL/L (ref 3.5–5.1)
RBC # BLD: 4.72 M/UL (ref 4.33–5.43)

## 2021-12-14 ENCOUNTER — HOSPITAL ENCOUNTER (OUTPATIENT)
Dept: HOSPITAL 97 - PRE | Age: 66
Discharge: HOME | End: 2021-12-14
Attending: UROLOGY
Payer: COMMERCIAL

## 2021-12-14 VITALS — OXYGEN SATURATION: 97 % | SYSTOLIC BLOOD PRESSURE: 112 MMHG | TEMPERATURE: 97.4 F | DIASTOLIC BLOOD PRESSURE: 57 MMHG

## 2021-12-14 DIAGNOSIS — C67.9: Primary | ICD-10-CM

## 2021-12-14 DIAGNOSIS — Z20.822: ICD-10-CM

## 2021-12-14 DIAGNOSIS — N26.1: ICD-10-CM

## 2021-12-14 DIAGNOSIS — D07.5: ICD-10-CM

## 2021-12-14 DIAGNOSIS — N13.5: ICD-10-CM

## 2021-12-14 PROCEDURE — 51610 INJECTION FOR BLADDER X-RAY: CPT

## 2021-12-14 PROCEDURE — BT02ZZZ PLAIN RADIOGRAPHY OF LEFT KIDNEY: ICD-10-PCS

## 2021-12-14 PROCEDURE — 88305 TISSUE EXAM BY PATHOLOGIST: CPT

## 2021-12-14 PROCEDURE — 0TJ50ZZ INSPECTION OF KIDNEY, OPEN APPROACH: ICD-10-PCS

## 2021-12-14 PROCEDURE — 87086 URINE CULTURE/COLONY COUNT: CPT

## 2021-12-14 PROCEDURE — 36415 COLL VENOUS BLD VENIPUNCTURE: CPT

## 2021-12-14 PROCEDURE — 85025 COMPLETE CBC W/AUTO DIFF WBC: CPT

## 2021-12-14 PROCEDURE — 87088 URINE BACTERIA CULTURE: CPT

## 2021-12-14 PROCEDURE — 0TB78ZX EXCISION OF LEFT URETER, VIA NATURAL OR ARTIFICIAL OPENING ENDOSCOPIC, DIAGNOSTIC: ICD-10-PCS

## 2021-12-14 PROCEDURE — 80048 BASIC METABOLIC PNL TOTAL CA: CPT

## 2021-12-14 PROCEDURE — 0T778DZ DILATION OF LEFT URETER WITH INTRALUMINAL DEVICE, VIA NATURAL OR ARTIFICIAL OPENING ENDOSCOPIC: ICD-10-PCS

## 2021-12-14 PROCEDURE — 74450 X-RAY URETHRA/BLADDER: CPT

## 2021-12-14 PROCEDURE — 93005 ELECTROCARDIOGRAM TRACING: CPT

## 2021-12-14 NOTE — OP
Date of Procedure:  12/14/2021



Surgeon:  CANDIDA HECK



Preoperative Diagnosis:  Left distal ureteral filling defect.



Postoperative Diagnoses:  

1.Left distal ureteral filling defect.

2.Congenital orthotopic obstructing left ureterocele.



Principal Procedures:  

1.Antegrade nephrostogram, left.

2.Removal of left nephrostomy tube.

3.Antegrade ureteroscopy with brush biopsy on the left side.

4.Antegrade left ureteral stent placement.

5.Cystoscopy.



Indication For Procedure:  Mr. Antoine presented with microscopic hematuria and evidence for CIS of t
he bladder and prostate.  He has initiated an induction course of BCG, but he had evidence of left di
stal ureteral obstruction and a filling defect which required evaluation.  I was previously unable to
 gain access into the ureteral orifice due to the average anatomy in that region, so a left percutane
ous nephrostomy tube was placed.  He presents today for definitive management of the left distal uret
eral obstruction.



Procedure In Detail:  The patient was consented in the preoperative holding area before being transfe
rred to the operative suite where general anesthesia was induced.  He was given ampicillin 2 g and ge
ntamicin 180 mg IV antimicrobial prophylaxis.  Pneumo boots were provided for DVT prophylaxis and he 
was placed in the prone position, padded and secured to the table appropriately.  His back and the in
dwelling nephrostomy tube were prepped using ChloraPrep and draped in standard fashion.  The case was
 then begun by injecting a 70:30 mixture of Omnipaque and saline and performing an antegrade nephrost
ogram. 



Left antegrade nephrostogram: 

The contrast was injected via the lumen of the nephrostomy tube and did delineate the renal pelvis an
d calices via a lower pole calyceal access point.  The proximal ureter for about 1 or 2 cm was also d
elineated, but contrast did not propagate down into the distal ureter at this point.  As a result, I 
cut the nephroscopy tube and passed an angled Sensor wire via the nephrostomy into the renal pelvis w
here I coiled it there.  No filling defects were noted within the renal pelvis on the antegrade nephr
ostogram study.  No filling defects were noted along the course of the ureter.  I then removed the ne
phrostomy tube and passed a dual-lumen catheter over the angled Sensor wire, ultimately utilizing thi
s to help navigate the Sensor wire down the ureter itself.  Before I could place the dual-lumen daquan
ter, I utilized a ureteral access balloon to dilate the tract to 15-[place].  Prior to doing this, I m
leann a slight skin incision to further open up the tract.  Once the tract was sufficiently dilated, I 
was then able to pass the dual-lumen catheter over the Sensor wire into the renal pelvis.  I then xu
igated the angled Sensor wire down the ureter where it is coiled in the distal ureter, but would not 
enter the bladder.  I advanced the dual-lumen catheter over the Sensor wire into the proximal ureter 
and again injected 70:30 contrast.  Again, no ureteral filling defects were noted, and the contrast d
id propagate down the entirety of the ureter, but no contrast would enter the bladder.  Again, no jacki
ling defects were noted along the course of the ureter.  I then passed a superstiff wire via the dual
-lumen catheter and coiled it in the distal ureter, saving this as a safety wire.  I then passed a ur
eteral access sheath over the Sensor wire into the mid distal ureter.  I then surveyed the distal ure
ter at the point of obstruction.  There, no papillary mucosal lesions were noted.  There was just a b
leah ending pouch of normal-appearing mucosa.  The wire was noted to be coiled in that location.  As 
a result, I utilized a brush biopsy to sample the mucosa of the blind ending pouch in the distal uret
er and this was sent for pathologic analysis.  I then passed a Sensor wire back via the ureteroscope,
 coiling it in the distal blind-ending ureter.  I removed the ureteral access sheath and surveyed ure
teroscopically the ureter and renal pelvis on the way out.  I then passed a 6-[place] Islander x 26 cm double-J
 left ureteral stent into the distal ureter where a coil was formed in the blind ending pouch.  An ad
ditional coil was formed within the kidney, which was confirmed by passing the dual-lumen catheter ov
er the safety wire into the renal pelvis and again injecting contrast to confirm the appropriate jose
l pelvic location.  The stent was left on its tether, and the string was secured to his flank using M
astisol and Steri-Strips.  We then took the patient out of the prone position and returned him supine
 before transferring him to the cystoscopy suite where he was placed in the lithotomy position.  His 
genitalia at this time were prepped using Hibiclens and draped in standard fashion.  A 22-[place]er rigi
d cystoscope was passed via the urethra into the bladder, and the bladder was again surveyed.  The mu
cosa was very erythematous, notable of his ongoing BCG treatment for the CIS.  Then, using fluoroscop
ic imagery, I navigated the tip of the cystoscope to the region of the tip of the coil of the uretera
l stent and confirmed the visible congenital orthotopic ureterocele, which was not bulbous because of
 lack or minimal urine production by the kidney and likely also it being decompressed by having had t
he nephrostomy tube in place.  As a result, I decompressed his bladder fluid and urine and discontinu
ed cystoscopy.  Efforts to resect the ureterocele and establish continuity with his upper tract was n
ot performed because this would have exposed his upper tract to the CIS which is still being initiall
y treated with the induction BCG. 



The patient was then taken out of the lithotomy position, awakened from general anesthesia, transferr
ed to a stretcher, and then transferred to the recovery room in good condition.



Complications:  None.



Discharge Disposition:  He should follow up in the Urology Clinic to continue his BCG induction treat
ment.  After the pathology becomes available, we will discuss this, and assuming it is benign, we billy
l consider simply removing the ureteral stent since the kidney is largely nonfunctional as long as it
 remains uninvolved.





WR/MODL

DD:  12/14/2021 10:54:43Voice ID:  [id number]

DT:  12/14/2021 11:39:39Report ID:  [id number]

## 2022-01-20 LAB
BUN BLD-MCNC: 20 MG/DL (ref 7–18)
GLUCOSE SERPLBLD-MCNC: 94 MG/DL (ref 74–106)
HCT VFR BLD CALC: 44.9 % (ref 39.6–49)
LYMPHOCYTES # SPEC AUTO: 1.1 K/UL (ref 0.7–4.9)
PMV BLD: 8 FL (ref 7.6–11.3)
POTASSIUM SERPL-SCNC: 4.1 MMOL/L (ref 3.5–5.1)
RBC # BLD: 5.01 M/UL (ref 4.33–5.43)

## 2022-01-25 ENCOUNTER — HOSPITAL ENCOUNTER (OUTPATIENT)
Dept: HOSPITAL 97 - OR | Age: 67
Discharge: HOME | End: 2022-01-25
Attending: UROLOGY
Payer: COMMERCIAL

## 2022-01-25 VITALS — OXYGEN SATURATION: 98 % | DIASTOLIC BLOOD PRESSURE: 74 MMHG | TEMPERATURE: 98.2 F | SYSTOLIC BLOOD PRESSURE: 128 MMHG

## 2022-01-25 DIAGNOSIS — D09.0: ICD-10-CM

## 2022-01-25 DIAGNOSIS — Z20.822: ICD-10-CM

## 2022-01-25 DIAGNOSIS — D07.5: Primary | ICD-10-CM

## 2022-01-25 DIAGNOSIS — Z85.51: ICD-10-CM

## 2022-01-25 PROCEDURE — 87086 URINE CULTURE/COLONY COUNT: CPT

## 2022-01-25 PROCEDURE — 0TBD8ZX EXCISION OF URETHRA, VIA NATURAL OR ARTIFICIAL OPENING ENDOSCOPIC, DIAGNOSTIC: ICD-10-PCS

## 2022-01-25 PROCEDURE — 36415 COLL VENOUS BLD VENIPUNCTURE: CPT

## 2022-01-25 PROCEDURE — 0T9B70Z DRAINAGE OF BLADDER WITH DRAINAGE DEVICE, VIA NATURAL OR ARTIFICIAL OPENING: ICD-10-PCS

## 2022-01-25 PROCEDURE — 0TBB8ZX EXCISION OF BLADDER, VIA NATURAL OR ARTIFICIAL OPENING ENDOSCOPIC, DIAGNOSTIC: ICD-10-PCS

## 2022-01-25 PROCEDURE — 52204 CYSTOSCOPY W/BIOPSY(S): CPT

## 2022-01-25 PROCEDURE — 51702 INSERT TEMP BLADDER CATH: CPT

## 2022-01-25 PROCEDURE — 88305 TISSUE EXAM BY PATHOLOGIST: CPT

## 2022-01-25 PROCEDURE — 87088 URINE BACTERIA CULTURE: CPT

## 2022-01-25 PROCEDURE — 85025 COMPLETE CBC W/AUTO DIFF WBC: CPT

## 2022-01-25 PROCEDURE — 80048 BASIC METABOLIC PNL TOTAL CA: CPT

## 2022-01-26 NOTE — OP
Surgeon:  CANDIDA HECK



Preoperative Diagnoses:  

1.History of urothelial carcinoma of the bladder, pathologic stage T1 and carcinoma in situ of the b
ladder with prostatic ductal urethral invasion.

2.Status post induction BCG.



Postoperative Diagnoses:  

1.History of urothelial carcinoma of the bladder, pathologic stage T1 and carcinoma in situ of the b
ladder with prostatic ductal urethral invasion.

2.Status post induction BCG.

3.Multifocal erythematous mucosa.



Principal Procedures:  

1.Cystoscopy with multiple targeted and random biopsies with fulguration.

2.Prostatic urethral biopsies with fulguration.

3.Placement of a urethral Ribeiro catheter.



Indication For Procedure:  Mr. Antoine presented to Urology Clinic with irritative lower urinary symp
toms and microscopic hematuria and underwent cystoscopic evaluation, which revealed the presence of n
odular erythematous mucosa.  He underwent operative cystoscopy and bladder biopsies, which revealed t
he presence of pathologic stage T1 high-grade urothelial carcinoma along with CIS of the bladder, and
 obstruction of the left ureteral orifice was noted due to the presence of a congenital orthotopic ur
eterocele with minimal residual left renal function.  He underwent induction BCG, which was completed
 about a month ago, and he presents today in followup for definitive restaging of the bladder, includ
ing restaging of the prostatic urethra given the ductal invasion seen.  He was previously counseled o
n the limited data showing benefit to BCG treatment for ductal involvement of the prostate with CIS, 
where only a solitary article suggested a 70% response rate, but he was adamantly opposed to the idea
 of cystectomy.  Unfortunately, we have no data suggesting any benefit to gemcitabine when prostatic 
urethral invasion is present.



Procedure In Detail:  The patient was consented in the preoperative holding area before being transfe
rred to the operative suite, where general anesthesia was induced.  He was given antimicrobial prophy
laxis and pneumo boots were provided for DVT prophylaxis.  He was placed in the lithotomy position, p
added and secured to the table appropriately.  His genitalia were prepped using Hibiclens and draped 
in standard fashion.  The case was begun using a 22-Citizen [place] rigid cystoscope to traverse the urethra a
nd into the bladder with ease.  The bladder was surveyed in its entirety, and there were no papillary
 mucosal lesions, foreign bodies, or stones noted throughout.  The mucosa was somewhat erythematous m
ultifocally throughout.  As a result, I began by targeting some of those spots of erythema within a p
attern of bladder biopsies as follows:

1.Trigone bladder biopsy.

2.Posterior bladder biopsies, 2 samples taken.

3.Dome bladder biopsies, 2 samples were taken.

4.Left lateral wall bladder biopsies, 2 samples were taken initially and then an additional 2 more s
amples were taken to target other areas of irregular mucosa.

5.Right lateral wall bladder biopsies, 2 samples taken. 



I have fulgurated each of those areas using the Bugbee electrode as the biopsies were done in sterile
 water until the mucosa was hemostatic.  The bladder was decompressed, and additional fulguration was
 performed as necessary to ensure absence of any ongoing bleeding.  Once this was done, we then switc
hed to normal saline irrigant, and I removed the 22-[place] rigid cystoscope and replaced it with a 26
-[place] bipolar resectoscope set after dilating his meatus and fossa navicularis to 30-[place]izen [place].  I the
n passed the resectoscope into his bladder with ease, and I then performed a prostatic urethral biops
ies extending from the 9 o'clock position on the right side across the bladder neck extending to the 
4 o'clock position on the left side removing any invaginating prostatic tissue down to the level of t
he verumontanum.  These chips were sent for pathologic analysis as prostatic urethral biopsies.  I fu
lgurated the base and any bleeding vessels within the prostate until the area was hemostatic.  I then
 surveyed the bladder again, and with the bladder being completely hemostatic, I then removed the res
ectoscope.  I placed a 20-[place]izen [place] 2-way Ribeiro catheter into his bladder with ease, and instilled 15 cc
 of sterile water in the balloon.  The catheter was connected to a leg bag, and the patient was taken
 out of the lithotomy position.  He was then awakened from general anesthesia, transferred to a [facility], and then transferred to the recovery room in good condition.



Complications:  None.



Discharge Disposition:  He may remove the urethral Ribeiro catheter himself at home on Thursday 7:00 a.
m. and should void by 1:00 p.m., where he should contact my office immediately to assist with reinser
tion of a Ribeiro catheter.  Should that be required, I recommend an 18-[place]izen [place] coude tipped catheter.  
Subsequent followup should be established in 1-2 weeks to discuss the results of the pathology and an
y next steps in treatment or maintenance.  I will discharge the patient with a prescription for Bactr
im Double Strength tablets twice daily for 3 days to cover him until after the catheter is removed al
ish with Tylenol with Codeine and a recommendation for Azo/Pyridium.





RODNEY/MODL

DD:  01/25/2022 16:37:17Voice ID:  [id number]

DT:  01/26/2022 00:57:31Report ID:  [id number]

## 2022-02-19 ENCOUNTER — HOSPITAL ENCOUNTER (EMERGENCY)
Dept: HOSPITAL 97 - ER | Age: 67
Discharge: HOME | End: 2022-02-19
Payer: COMMERCIAL

## 2022-02-19 VITALS — DIASTOLIC BLOOD PRESSURE: 72 MMHG | SYSTOLIC BLOOD PRESSURE: 126 MMHG

## 2022-02-19 VITALS — TEMPERATURE: 98.9 F

## 2022-02-19 VITALS — OXYGEN SATURATION: 97 %

## 2022-02-19 DIAGNOSIS — Z95.1: ICD-10-CM

## 2022-02-19 DIAGNOSIS — L50.9: Primary | ICD-10-CM

## 2022-02-19 DIAGNOSIS — N39.0: ICD-10-CM

## 2022-02-19 DIAGNOSIS — I10: ICD-10-CM

## 2022-02-19 DIAGNOSIS — C67.9: ICD-10-CM

## 2022-02-19 PROCEDURE — 99283 EMERGENCY DEPT VISIT LOW MDM: CPT

## 2022-02-19 PROCEDURE — 87088 URINE BACTERIA CULTURE: CPT

## 2022-02-19 PROCEDURE — 81015 MICROSCOPIC EXAM OF URINE: CPT

## 2022-02-19 PROCEDURE — 81003 URINALYSIS AUTO W/O SCOPE: CPT

## 2022-02-19 PROCEDURE — 96375 TX/PRO/DX INJ NEW DRUG ADDON: CPT

## 2022-02-19 PROCEDURE — 96365 THER/PROPH/DIAG IV INF INIT: CPT

## 2022-02-19 PROCEDURE — 87086 URINE CULTURE/COLONY COUNT: CPT

## 2022-02-19 NOTE — ER
Nurse's Notes                                                                                     

 [facility]                                                                 

Name: Cj Antoine                                                                               

Age: 66 yrs                                                                                       

Sex: Male                                                                                         

: 1955                                                                                   

MRN: [id number]                                                                                   

Arrival Date: 2022                                                                          

Time: 08:44                                                                                       

Account#: [id number]                                                                            

Bed 20                                                                                            

Private MD: Drew Minor; Ronnie Mustafa                                                      

Diagnosis: UTI/ Urinary tract infection, site not specified;Urticaria, unspecified                

                                                                                                  

Presentation:                                                                                     

                                                                                             

08:52 Chief complaint: Patient states: Woke up this morning with itchy, burning rash all over jl7 

      torso. Coronavirus screen: At this time, the client does not indicate any symptoms          

      associated with coronavirus-19. Ebola Screen: No symptoms or risks identified at this       

      time. Initial Sepsis Screen: Does the patient meet any 2 criteria? No. Patient's            

      initial sepsis screen is negative. Does the patient have a suspected source of              

      infection? No. Patient's initial sepsis screen is negative. Risk Assessment: Do you         

      want to hurt yourself or someone else? Patient reports no desire to harm self or            

      others. Onset of symptoms was 2022.                                            

08:52 Method Of Arrival: Ambulatory                                                           [facility] 

08:52 Acuity: IRIS 3                                                                           jl7 

                                                                                                  

Triage Assessment:                                                                                

08:54 General: Appears in no apparent distress. uncomfortable, Behavior is calm, cooperative, jl7 

      appropriate for age. Pain: Complains of pain in pelvis Quality of pain is described as      

      discomfort from chemo 2 days ago.                                                           

                                                                                                  

Historical:                                                                                       

- Allergies:                                                                                      

08:54 No Known Allergies;                                                                     jl7 

- Home Meds:                                                                                      

08:54 amlodipine oral [Active]; tamsulosin oral [Active];                                     jl7 

08:57 Chemo [Active];                                                                         jl7 

- PMHx:                                                                                           

08:54 Hypertension;                                                                           jl7 

08:57 bladder cancer;                                                                         jl7 

- PSHx:                                                                                           

08:54 Cholecystectomy; CABG; abdominal aorta stent; back;                                     jl7 

                                                                                                  

- Immunization history:: Client reports receiving the 2nd dose of the Covid vaccine,              

  [employer].                                                                                         

- Social history:: Smoking status: Patient denies any tobacco usage or history of.                

                                                                                                  

                                                                                                  

Screenin:05 Abuse screen: Denies threats or abuse. Denies injuries from another. Nutritional        bp  

      screening: No deficits noted. Tuberculosis screening: No symptoms or risk factors           

      identified. Fall Risk None identified.                                                      

                                                                                                  

Assessment:                                                                                       

09:05 General: SEE TRIAGE NOTE.                                                               bp  

10:48 Reassessment: No changes from previously documented assessment. Patient and/or family   bp  

      updated on plan of care and expected duration. Pain level reassessed.                       

11:40 Reassessment: PT D/C HOME AMBULATORY WITH FAMILY, DX WITH UTI.                          bp  

                                                                                                  

Vital Signs:                                                                                      

08:52 Weight 83.91 kg; Height 5 ft. 10 in. (177.80 cm);                                       jl7 

08:54  / 67 LA (/reg); Pulse 90; Resp 20; Temp 98.9; Pulse Ox 100% ;                    mb7 

09:45  / 60; Pulse 75; Resp 16; Pulse Ox 95% ;                                          bp  

10:47  / 73; Pulse 74; Resp 17; Pulse Ox 97% ;                                          bp  

11:40  / 72; Pulse 72; Resp 16; Pulse Ox 97% ;                                          bp  

08:52 Body Mass Index 26.54 (83.91 kg, 177.80 cm)                                             jl7 

                                                                                                  

ED Course:                                                                                        

08:44 Patient arrived in ED.                                                                  as  

08:44 Drew Minor MD is Private Physician.                                               as  

08:44 Ronnie Mustafa MD is Private Physician.                                               as  

08:49 Melvin Alex NP is PHCP.                                                           pm1 

08:49 Devonte Harvey MD is Attending Physician.                                                pm1 

08:54 Triage completed.                                                                       jl7 

08:54 Arm band placed on right wrist.                                                         jl7 

09:04 Matheus Landa, WILFREDO is Primary Nurse.                                                    bp  

09:05 Patient has correct armband on for positive identification. Bed in low position. Call   bp  

      light in reach. Side rails up X2.                                                           

09:24 Inserted saline lock: 20 gauge in right antecubital area, using aseptic technique.      jw7 

10:50 Ronnie Mustafa MD is Referral Physician.                                              pm1 

11:40 No provider procedures requiring assistance completed. IV discontinued, intact,         bp  

      bleeding controlled, No redness/swelling at site. Pressure dressing applied.                

                                                                                                  

Administered Medications:                                                                         

09:28 Drug: Pepcid (famotidine) 20 mg Route: IVP; Site: right antecubital;                    bp  

10:30 Follow up: Response: No adverse reaction                                                bp  

09:28 Drug: Benadryl (diphenhydrAMINE) 25 mg Route: IVP; Site: right antecubital;             bp  

10:30 Follow up: Response: No adverse reaction                                                bp  

09:28 Drug: SOLU-Medrol (methylPrednisoLONE) 125 mg Route: IVP; Site: right antecubital;      bp  

10:30 Follow up: Response: No adverse reaction                                                bp  

10:15 Drug: Rocephin (cefTRIAXone) 1 grams Route: IV; Rate: calculated rate; Site: right      bp  

      antecubital;                                                                                

11:42 Follow up: IV Status: Completed infusion; IV Intake: 50ml                               bp  

                                                                                                  

                                                                                                  

Intake:                                                                                           

11:42 IV: 50ml; Total: 50ml.                                                                  bp  

                                                                                                  

Outcome:                                                                                          

10:50 Discharge ordered by MD.                                                                pm1 

11:40 Discharged to home ambulatory, with family.                                             bp  

11:40 Condition: stable                                                                           

11:40 Discharge instructions given to patient, family, Instructed on discharge instructions,      

      follow up and referral plans. medication usage, Demonstrated understanding of               

      instructions, follow-up care, medications, Prescriptions given X 4.                         

11:42 Patient left the ED.                                                                    bp  

                                                                                                  

Signatures:                                                                                       

Chloe Koenig Patrick, ENRIQUE                    NP   pm1                                                  

Kennedy Levy RN                        RN   jl7                                                  

Matheus Landa RN                      RN   bp                                                   

Mary Vazquez                               mb7                                                  

Tanya Ledezma                                  jw7                                                  

                                                                                                  

**************************************************************************************************

## 2022-02-19 NOTE — EDPHYS
Physician Documentation                                                                           

 [facility]                                                                 

Name: Cj Antoine                                                                               

Age: 66 yrs                                                                                       

Sex: Male                                                                                         

: 1955                                                                                   

MRN: [id number]                                                                                   

Arrival Date: 2022                                                                          

Time: 08:44                                                                                       

Account#: [id number]                                                                            

Bed 20                                                                                            

Private MD: Drew Minor; Ronnie Mustafa                                                      

ED Physician Devonte Harvey                                                                         

HPI:                                                                                              

                                                                                             

09:11 This 66 yrs old Male presents to ER via Ambulatory with complaints of Rash.             pm1 

09:11 The patient's rash thought to be caused by an unknown cause. The rash is located on the pm1 

      back, chest and abdomen. The rash can be described as urticarial. Onset: The                

      symptoms/episode began/occurred yesterday. Associated signs and symptoms: Pertinent         

      positives: burning sensation, Pertinent negatives: difficulty breathing, fever,             

      itching, nausea, wheezing. Severity of symptoms: in the emergency department the            

      symptoms are unchanged. Treatment given at home: none. The patient has not experienced      

      similar symptoms in the past. The patient has been recently seen by a physician: with       

      different complaint(s), Patient is getting gemcitabine chemotherapy for bladder cancer.     

      Received a dosage on Tuesday and Thursday. Patient reports increased burning with           

      urination and took azoo this AM.                                                            

                                                                                                  

Historical:                                                                                       

- Allergies:                                                                                      

08:54 No Known Allergies;                                                                     jl7 

- Home Meds:                                                                                      

08:54 amlodipine oral [Active]; tamsulosin oral [Active];                                     jl7 

08:57 Chemo [Active];                                                                         jl7 

- PMHx:                                                                                           

08:54 Hypertension;                                                                           jl7 

08:57 bladder cancer;                                                                         jl7 

- PSHx:                                                                                           

08:54 Cholecystectomy; CABG; abdominal aorta stent; back;                                     jl7 

                                                                                                  

- Immunization history:: Client reports receiving the 2nd dose of the Covid vaccine,              

  [employer].                                                                                         

- Social history:: Smoking status: Patient denies any tobacco usage or history of.                

                                                                                                  

                                                                                                  

ROS:                                                                                              

09:11 Constitutional: Negative for fever, chills, and weight loss, Cardiovascular: Negative   pm1 

      for chest pain, palpitations, and edema, Respiratory: Negative for shortness of breath,     

      cough, wheezing, and pleuritic chest pain, Abdomen/GI: Negative for abdominal pain,         

      nausea, vomiting, diarrhea, and constipation.                                               

09:11 MS/Extremity: Negative for injury and deformity.                                            

09:11 : Positive for burning with urination, Negative for pelvic pain, flank pain.              

09:11 Skin: Positive for rash, of the abdomen and chest and back.                                 

09:11 All other systems are negative.                                                             

                                                                                                  

Exam:                                                                                             

09:11 Constitutional:  This is a well developed, well nourished patient who is awake, alert,  pm1 

      and in no acute distress. Head/Face:  Normocephalic, atraumatic.                            

09:11 Back:  No spinal tenderness.  No costovertebral tenderness.  Full range of motion.          

09:11 Cardiovascular: Exam negative for  acute changes, Rate: normal, Rhythm: regular,            

      Pulses: no pulse deficits are appreciated, Heart sounds: normal, normal S1and S2.           

09:11 Respiratory: Exam negative for  acute changes, respiratory distress, shortness of           

      breath, Breath sounds: are clear throughout.                                                

09:11 Abdomen/GI: Inspection: abdomen appears normal, Palpation: abdomen is soft and              

      non-tender, in all quadrants.                                                               

09:11 Skin: Appearance: normal except for affected area, consistent with  urticaria, on the       

      chest and back and abdomen.                                                                 

09:11 Neuro: Exam negative for acute changes, Orientation: is normal, Mentation: is normal,       

      Motor: is normal, moves all fours.                                                          

                                                                                                  

Vital Signs:                                                                                      

08:52 Weight 83.91 kg; Height 5 ft. 10 in. (177.80 cm);                                       jl7 

08:54  / 67 LA (/reg); Pulse 90; Resp 20; Temp 98.9; Pulse Ox 100% ;                    mb7 

09:45  / 60; Pulse 75; Resp 16; Pulse Ox 95% ;                                          bp  

10:47  / 73; Pulse 74; Resp 17; Pulse Ox 97% ;                                          bp  

11:40  / 72; Pulse 72; Resp 16; Pulse Ox 97% ;                                          bp  

08:52 Body Mass Index 26.54 (83.91 kg, 177.80 cm)                                             jl7 

                                                                                                  

MDM:                                                                                              

08:59 Patient medically screened.                                                             pm1 

10:48 Data reviewed: vital signs. Data interpreted: Pulse oximetry: on room air is 97 %.      pm1 

      Interpretation: normal. Counseling: I had a detailed discussion with the patient and/or     

      guardian regarding: the historical points, exam findings, and any diagnostic results        

      supporting the discharge/admit diagnosis, lab results, the need for outpatient follow       

      up, a urologist, to return to the emergency department if symptoms worsen or persist or     

      if there are any questions or concerns that arise at home.                                  

10:57 Physician consultation: Ronnie Mustafa MD Left message requesting consultation for his  pm1 

      patient.                                                                                    

                                                                                                  

                                                                                             

09:24 Order name: Urine Microscopic Only                                                      pm1 

                                                                                             

09:25 Order name: Urine Microscopic Only; Complete Time: 10:02                                EDMS

                                                                                             

09:26 Order name: Urine Dipstick-Ancillary; Complete Time: 09:29                              EDMS

                                                                                             

09:56 Order name: Urine Culture                                                               EDMS

                                                                                             

09:09 Order name: IV Saline Lock; Complete Time: 09:22                                        pm1 

                                                                                             

09:11 Order name: Urine Dipstick-Ancillary (obtain specimen); Complete Time: 09:28            pm1 

                                                                                                  

Administered Medications:                                                                         

09:28 Drug: Pepcid (famotidine) 20 mg Route: IVP; Site: right antecubital;                    bp  

10:30 Follow up: Response: No adverse reaction                                                bp  

09:28 Drug: Benadryl (diphenhydrAMINE) 25 mg Route: IVP; Site: right antecubital;             bp  

10:30 Follow up: Response: No adverse reaction                                                bp  

09:28 Drug: SOLU-Medrol (methylPrednisoLONE) 125 mg Route: IVP; Site: right antecubital;      bp  

10:30 Follow up: Response: No adverse reaction                                                bp  

10:15 Drug: Rocephin (cefTRIAXone) 1 grams Route: IV; Rate: calculated rate; Site: right      bp  

      antecubital;                                                                                

11:42 Follow up: IV Status: Completed infusion; IV Intake: 50ml                               bp  

                                                                                                  

                                                                                                  

Disposition:                                                                                      

15:05 Co-signature as Attending Physician, Devonte Harvey MD.                                    rn  

                                                                                                  

Disposition Summary:                                                                              

22 10:50                                                                                    

Discharge Ordered                                                                                 

      Location: Home                                                                          pm1 

      Problem: new                                                                            pm1 

      Symptoms: have improved                                                                 pm1 

      Condition: Stable                                                                       pm1 

      Diagnosis                                                                                   

        - UTI/ Urinary tract infection, site not specified                                    pm1 

        - Urticaria, unspecified                                                              pm1 

      Followup:                                                                               pm1 

        - With: Emergency Department                                                               

        - When: As needed                                                                          

        - Reason: Worsening of condition                                                           

      Followup:                                                                               pm1 

        - With: Ronnie Mustafa MD                                                                

        - When: 2 - 3 days                                                                         

        - Reason: Recheck today's complaints, Continuance of care, Re-evaluation by your           

      physician                                                                                   

      Discharge Instructions:                                                                     

        - Discharge Summary Sheet                                                             pm1 

        - Hives                                                                               pm1 

        - Urinary Tract Infection, Adult                                                      pm1 

      Forms:                                                                                      

        - Medication Reconciliation Form                                                      pm1 

        - Thank You Letter                                                                    pm1 

        - Antibiotic Education                                                                pm1 

        - Prescription Opioid Use                                                             pm1 

      Prescriptions:                                                                              

        - Benadryl 25 mg Oral Capsule                                                              

            - take 1 capsule by ORAL route every 6 hours As needed; 30 tablet; Refills: 0,    pm1 

      Product Selection Permitted                                                                 

        - Pepcid 20 mg Oral Tablet                                                                 

            - take 1 tablet by ORAL route every 12 hours for 10 days; 20 tablet; Refills: 0,  pm1 

      Product Selection Permitted                                                                 

        - Medrol (Acosta) 4 mg Oral Tablets, Dose Pack                                                

            - take 1 tablet by ORAL route as directed - follow package instructions; 1        pm1 

      packet; Refills: 0, Product Selection Permitted                                             

        - Bactrim -160 mg Oral Tablet                                                        

            - take 1 tablet by ORAL route every 12 hours for 10 days; 20 tablet; Refills: 0,  pm1 

      Product Selection Permitted                                                                 

Signatures:                                                                                       

Dispatcher [employer]                           EDDevonte Stubbs MD MD rn Marinas, Patrick, NP                    NP   pm1                                                  

Kennedy Levy, RN                        RN   jl7                                                  

Matheus Landa RN                      RN   bp                                                   

                                                                                                  

**************************************************************************************************